# Patient Record
Sex: FEMALE | Race: WHITE | Employment: STUDENT | URBAN - METROPOLITAN AREA
[De-identification: names, ages, dates, MRNs, and addresses within clinical notes are randomized per-mention and may not be internally consistent; named-entity substitution may affect disease eponyms.]

---
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
## 2021-12-25 ENCOUNTER — HOSPITAL ENCOUNTER (EMERGENCY)
Facility: CLINIC | Age: 21
Discharge: HOME | End: 2021-12-25
Attending: EMERGENCY MEDICINE | Admitting: EMERGENCY MEDICINE
Payer: COMMERCIAL

## 2021-12-25 VITALS
SYSTOLIC BLOOD PRESSURE: 106 MMHG | RESPIRATION RATE: 20 BRPM | OXYGEN SATURATION: 100 % | TEMPERATURE: 98.3 F | HEIGHT: 61 IN | BODY MASS INDEX: 20.78 KG/M2 | HEART RATE: 96 BPM | DIASTOLIC BLOOD PRESSURE: 57 MMHG

## 2021-12-25 DIAGNOSIS — J03.90 TONSILLITIS: ICD-10-CM

## 2021-12-25 LAB
DEPRECATED S PYO AG THROAT QL EIA: NEGATIVE
FLUAV RNA SPEC QL NAA+PROBE: NEGATIVE
FLUBV RNA RESP QL NAA+PROBE: NEGATIVE
GROUP A STREP BY PCR: NOT DETECTED
SARS-COV-2 RNA RESP QL NAA+PROBE: NEGATIVE

## 2021-12-25 PROCEDURE — 87636 SARSCOV2 & INF A&B AMP PRB: CPT | Performed by: EMERGENCY MEDICINE

## 2021-12-25 PROCEDURE — 87651 STREP A DNA AMP PROBE: CPT | Performed by: EMERGENCY MEDICINE

## 2021-12-25 PROCEDURE — 250N000009 HC RX 250: Performed by: EMERGENCY MEDICINE

## 2021-12-25 PROCEDURE — 99283 EMERGENCY DEPT VISIT LOW MDM: CPT

## 2021-12-25 RX ORDER — DEXAMETHASONE SODIUM PHOSPHATE 10 MG/ML
10 INJECTION, SOLUTION INTRAMUSCULAR; INTRAVENOUS ONCE
Status: COMPLETED | OUTPATIENT
Start: 2021-12-25 | End: 2021-12-25

## 2021-12-25 RX ADMIN — DEXAMETHASONE SODIUM PHOSPHATE 10 MG: 10 INJECTION, SOLUTION INTRAMUSCULAR; INTRAVENOUS at 19:08

## 2021-12-25 ASSESSMENT — ENCOUNTER SYMPTOMS
FEVER: 0
SORE THROAT: 1

## 2021-12-26 NOTE — ED PROVIDER NOTES
"  History   Chief Complaint:  Pharyngitis       History is provided by the patient.    HPI   Valery Wood is a 21 year old female with history of recurrent tonsillitis who presents with pharyngitis. She says that she woke up today at 0200 with a sore throat and a right sided neck pain. The pain is somewhat exacerbated when talking. No recent fevers. She believes this is likely another episode of tonsillitis. She is COVID vaccinated.     Review of Systems   Constitutional: Negative for fever.   HENT: Positive for sore throat.    All other systems reviewed and are negative.      Allergies:  The patient has no known allergies.    Medications:  Albuterol  Neurontin  Lamictal    Past Medical History:     Depression with suicidal ideation  Asthma  MILAGRO  Onychomycosis  Polysubstance abuse  Vitamin D deficiency  Tobacco abuse  Recurrent tonsillitis    Past Surgical History:    The patient denies any surgical history.    Family History:    The patient denies any family history.    Social History:  Patient came from home.  Patient is accompanied in the ED by her father.    Physical Exam     Patient Vitals for the past 24 hrs:   BP Temp Temp src Pulse Resp SpO2 Height   12/25/21 1743 106/57 98.3  F (36.8  C) Oral 96 20 100 % 1.549 m (5' 1\")       Physical Exam  Vitals: reviewed by me  General: Pt seen on [facility], pleasant, cooperative, and alert to conversation  Eyes: Tracking well, clear conjunctiva BL  ENT: MMM, midline trachea. BL enlarged tonsils, no exudate, not kissing.  Amply patent airway.  Submandibular space supple, non tender. FROM to neck.  Midline uvula, normal OP  Lungs: No tachypnea, no accessory muscle use. No respiratory distress.   CV: Rate as above  Abd: Soft, non tender, no guarding, no rebound. Non distended  MSK: no joint effusion.  No evidence of trauma  Skin: No rash  Neuro: Clear speech and no facial droop.  Psych: Not RIS, no e/o AH/VH      Emergency Department Course     Laboratory:    Rapid " Strep: Negative  Culture: Pending    Symptomatic Influenza A/B & SARS-CoV-2 (COVID19) Virus PCR Multiplex: Negative     Emergency Department Course:    Reviewed:  I reviewed nursing notes, vitals and past medical history    Assessments:  1850 I obtained history and examined the patient as noted above.     Interventions:  1908 Decadron 10 mg PO    Disposition:  The patient was discharged to home.     Impression & Plan     CMS Diagnoses: None    Covid-19  Valery Wood was evaluated during a global COVID-19 pandemic, which necessitated consideration that the patient might be at risk for infection with the SARS-CoV-2 virus that causes COVID-19.   Applicable protocols for evaluation were followed during the patient's care.   COVID-19 was considered as part of the patient's evaluation. The plan for testing is:  a test was obtained during this visit.    Medical Decision Making:  This is a pleasant 21-year-old female presents emergency room with swollen tonsils.  She has a negative strep test, no evidence of an RPA or peritonsillar abscess, and a negative Covid test.  She has no evidence of airway obstruction, has no evidence of necrotizing soft tissue or deep space tissue infection, and I do think that she qualifies for Decadron to help with the swelling that she does have in her tonsils.  No exudate, I think this is likely a viral cause and her tonsils are only mildly swollen.  We will plan for discharge home with return to ED precautions, and referral to ENT from her regular doctor as she may need to have a tonsillectomy.  Dad at bedside is okay with this plan as well, will plan for discharge as above.    Diagnosis:    ICD-10-CM    1. Tonsillitis  J03.90        Discharge Medications:  New Prescriptions    PHENOL (CHLORASEPTIC) 1.4 % SPRAY    Take 1 spray (1 mL) by mouth every hour as needed for sore throat       Scribe Disclosure:  I, Orlando Baeza, am serving as a scribe at 6:09 PM on 12/25/2021 to document services  personally performed by William Bravo MD based on my observations and the provider's statements to me.        William Bravo MD  12/25/21 [id number]

## 2021-12-26 NOTE — DISCHARGE INSTRUCTIONS
As we discussed, your tonsils are swollen, this is possibly due to a virus as your strep throat test is negative.  Please follow with the clinic that we put in touch with [facility], and seek an ENT referral as you may need to have your tonsils removed since your chronic infections.  Please come back immediately to the ER with any fevers, any vomiting, if you cannot swallow liquids or food, or if your pain gets worse.  Please do use Tylenol Motrin to help with your pain.  Come back with any other concerns.

## 2023-02-28 ENCOUNTER — LAB REQUISITION (OUTPATIENT)
Dept: LAB | Facility: CLINIC | Age: 23
End: 2023-02-28
Payer: COMMERCIAL

## 2023-02-28 DIAGNOSIS — Z01.419 ENCOUNTER FOR GYNECOLOGICAL EXAMINATION (GENERAL) (ROUTINE) WITHOUT ABNORMAL FINDINGS: ICD-10-CM

## 2023-02-28 PROCEDURE — G0145 SCR C/V CYTO,THINLAYER,RESCR: HCPCS | Mod: ORL | Performed by: PHYSICIAN ASSISTANT

## 2023-03-02 LAB
BKR LAB AP GYN ADEQUACY: NORMAL
BKR LAB AP GYN INTERPRETATION: NORMAL
BKR LAB AP HPV REFLEX: NORMAL
BKR LAB AP LMP: NORMAL
BKR LAB AP PREVIOUS ABNL DX: NORMAL
BKR LAB AP PREVIOUS ABNORMAL: NORMAL
PATH REPORT.COMMENTS IMP SPEC: NORMAL
PATH REPORT.COMMENTS IMP SPEC: NORMAL
PATH REPORT.RELEVANT HX SPEC: NORMAL

## 2024-08-15 ENCOUNTER — TRANSCRIBE ORDERS (OUTPATIENT)
Dept: OTHER | Age: 24
End: 2024-08-15

## 2024-08-15 DIAGNOSIS — M79.10 MUSCLE PAIN: Primary | ICD-10-CM

## 2025-02-10 ENCOUNTER — LAB REQUISITION (OUTPATIENT)
Dept: LAB | Facility: CLINIC | Age: 25
End: 2025-02-10
Payer: COMMERCIAL

## 2025-02-10 DIAGNOSIS — R63.5 ABNORMAL WEIGHT GAIN: ICD-10-CM

## 2025-02-10 DIAGNOSIS — Z13.228 ENCOUNTER FOR SCREENING FOR OTHER METABOLIC DISORDERS: ICD-10-CM

## 2025-02-10 LAB
ERYTHROCYTE [DISTWIDTH] IN BLOOD BY AUTOMATED COUNT: 13.5 % (ref 10–15)
EST. AVERAGE GLUCOSE BLD GHB EST-MCNC: 108 MG/DL
HBA1C MFR BLD: 5.4 %
HCT VFR BLD AUTO: 40.2 % (ref 35–47)
HGB BLD-MCNC: 12.8 G/DL (ref 11.7–15.7)
MCH RBC QN AUTO: 25.6 PG (ref 26.5–33)
MCHC RBC AUTO-ENTMCNC: 31.8 G/DL (ref 31.5–36.5)
MCV RBC AUTO: 80 FL (ref 78–100)
PLATELET # BLD AUTO: 342 10E3/UL (ref 150–450)
RBC # BLD AUTO: 5 10E6/UL (ref 3.8–5.2)
TSH SERPL DL<=0.005 MIU/L-ACNC: 2.46 UIU/ML (ref 0.3–4.2)
WBC # BLD AUTO: 8.3 10E3/UL (ref 4–11)

## 2025-02-10 PROCEDURE — 84443 ASSAY THYROID STIM HORMONE: CPT | Mod: ORL | Performed by: STUDENT IN AN ORGANIZED HEALTH CARE EDUCATION/TRAINING PROGRAM

## 2025-02-10 PROCEDURE — 83036 HEMOGLOBIN GLYCOSYLATED A1C: CPT | Mod: ORL | Performed by: STUDENT IN AN ORGANIZED HEALTH CARE EDUCATION/TRAINING PROGRAM

## 2025-02-10 PROCEDURE — 85027 COMPLETE CBC AUTOMATED: CPT | Mod: ORL | Performed by: STUDENT IN AN ORGANIZED HEALTH CARE EDUCATION/TRAINING PROGRAM
